# Patient Record
Sex: FEMALE | Race: WHITE | NOT HISPANIC OR LATINO | URBAN - METROPOLITAN AREA
[De-identification: names, ages, dates, MRNs, and addresses within clinical notes are randomized per-mention and may not be internally consistent; named-entity substitution may affect disease eponyms.]

---

## 2019-01-23 ENCOUNTER — EMERGENCY (EMERGENCY)
Facility: HOSPITAL | Age: 49
LOS: 1 days | End: 2019-01-23
Payer: COMMERCIAL

## 2019-01-23 PROCEDURE — 74177 CT ABD & PELVIS W/CONTRAST: CPT | Mod: 26

## 2019-01-23 PROCEDURE — 99284 EMERGENCY DEPT VISIT MOD MDM: CPT | Mod: 25

## 2019-08-19 ENCOUNTER — APPOINTMENT (OUTPATIENT)
Dept: OBGYN | Facility: CLINIC | Age: 49
End: 2019-08-19
Payer: COMMERCIAL

## 2019-08-19 VITALS — HEIGHT: 62 IN | WEIGHT: 128 LBS | BODY MASS INDEX: 23.55 KG/M2

## 2019-08-19 DIAGNOSIS — Z86.39 PERSONAL HISTORY OF OTHER ENDOCRINE, NUTRITIONAL AND METABOLIC DISEASE: ICD-10-CM

## 2019-08-19 DIAGNOSIS — Z78.9 OTHER SPECIFIED HEALTH STATUS: ICD-10-CM

## 2019-08-19 DIAGNOSIS — Z87.42 PERSONAL HISTORY OF OTHER DISEASES OF THE FEMALE GENITAL TRACT: ICD-10-CM

## 2019-08-19 DIAGNOSIS — Z80.3 FAMILY HISTORY OF MALIGNANT NEOPLASM OF BREAST: ICD-10-CM

## 2019-08-19 PROCEDURE — 99386 PREV VISIT NEW AGE 40-64: CPT

## 2019-08-19 NOTE — DISCUSSION/SUMMARY
[FreeTextEntry1] : a48 years old white female into the office for annual exam patient has been feeling well offers no complaints physical on pelvic exam within normal limits Pap smear done and the patient from a bent 25 minutes with the patient

## 2019-08-22 LAB — HPV HIGH+LOW RISK DNA PNL CVX: NOT DETECTED

## 2019-08-26 LAB — CYTOLOGY CVX/VAG DOC THIN PREP: NORMAL

## 2019-11-13 ENCOUNTER — APPOINTMENT (OUTPATIENT)
Dept: MAMMOGRAPHY | Facility: CLINIC | Age: 49
End: 2019-11-13

## 2021-08-17 ENCOUNTER — APPOINTMENT (OUTPATIENT)
Dept: OBGYN | Facility: CLINIC | Age: 51
End: 2021-08-17
Payer: COMMERCIAL

## 2021-08-17 ENCOUNTER — NON-APPOINTMENT (OUTPATIENT)
Age: 51
End: 2021-08-17

## 2021-08-17 VITALS
SYSTOLIC BLOOD PRESSURE: 110 MMHG | HEIGHT: 62 IN | WEIGHT: 130 LBS | BODY MASS INDEX: 23.92 KG/M2 | DIASTOLIC BLOOD PRESSURE: 68 MMHG

## 2021-08-17 DIAGNOSIS — Z00.00 ENCOUNTER FOR GENERAL ADULT MEDICAL EXAMINATION W/OUT ABNORMAL FINDINGS: ICD-10-CM

## 2021-08-17 PROCEDURE — 99396 PREV VISIT EST AGE 40-64: CPT

## 2021-08-17 NOTE — HISTORY OF PRESENT ILLNESS
[Patient reported mammogram was normal] : Patient reported mammogram was normal [Patient reported PAP Smear was normal] : Patient reported PAP Smear was normal [FreeTextEntry1] : 51yo  LMP Aug 2020 here for annual. Pt is not COVID vaccinated.  [Mammogramdate] : 12/2019  [PapSmeardate] : 2019  [BoneDensityDate] : N/A [ColonoscopyDate] : N/A

## 2021-08-17 NOTE — COUNSELING
[Nutrition/ Exercise/ Weight Management] : nutrition, exercise, weight management [Intimate Partner Violence] : intimate partner violence [STD (testing, results, tx)] : STD (testing, results, tx)

## 2021-08-17 NOTE — DISCUSSION/SUMMARY
[FreeTextEntry1] : 51yo  LMP Aug 2020 here for annual with blood blister on her inner vulva \par \par Blood blister: \par - RTO in 3 months to discuss stability\par \par Menopause: \par - Pt to continue to monitor periods. If she makes it to the end of Aug then she will be a year without menses \par - If pt has bleeding she is to start the clock over \par \par RHM: \par - DVS neg x 3\par - Dentist, PCP, Derm as discussed \par - Rx given for DEXA, mammo/sono\par - Colonoscopy as discussed \par - Offered STI screen today. Pt declined \par - Encouraged healthy diet, exercise, weight maint. \par \par Sheeba Aly MD \par Obstetrician/Gynecologist\par

## 2021-08-17 NOTE — PHYSICAL EXAM
[Appropriately responsive] : appropriately responsive [Alert] : alert [No Acute Distress] : no acute distress [No Lymphadenopathy] : no lymphadenopathy [Regular Rate Rhythm] : regular rate rhythm [No Murmurs] : no murmurs [Clear to Auscultation B/L] : clear to auscultation bilaterally [Soft] : soft [Non-tender] : non-tender [Non-distended] : non-distended [No HSM] : No HSM [No Lesions] : no lesions [No Mass] : no mass [Oriented x3] : oriented x3 [Examination Of The Breasts] : a normal appearance [No Masses] : no breast masses were palpable [Labia Majora] : normal [Labia Minora] : normal [Normal] : normal [Uterine Adnexae] : normal [Declined] : Patient declined rectal exam [FreeTextEntry1] : 3mm "blood blister" on internal labia minora just R of midline.

## 2021-08-20 LAB — HPV HIGH+LOW RISK DNA PNL CVX: NOT DETECTED

## 2021-08-23 LAB — CYTOLOGY CVX/VAG DOC THIN PREP: NORMAL

## 2021-09-23 ENCOUNTER — NON-APPOINTMENT (OUTPATIENT)
Age: 51
End: 2021-09-23

## 2021-09-30 ENCOUNTER — APPOINTMENT (OUTPATIENT)
Dept: OPHTHALMOLOGY | Facility: CLINIC | Age: 51
End: 2021-09-30

## 2021-10-20 ENCOUNTER — APPOINTMENT (OUTPATIENT)
Dept: OBGYN | Facility: CLINIC | Age: 51
End: 2021-10-20

## 2022-01-04 ENCOUNTER — APPOINTMENT (OUTPATIENT)
Dept: OBGYN | Facility: CLINIC | Age: 52
End: 2022-01-04

## 2022-01-18 ENCOUNTER — APPOINTMENT (OUTPATIENT)
Dept: OBGYN | Facility: CLINIC | Age: 52
End: 2022-01-18
Payer: COMMERCIAL

## 2022-01-18 VITALS
HEIGHT: 62 IN | SYSTOLIC BLOOD PRESSURE: 104 MMHG | WEIGHT: 133.9 LBS | BODY MASS INDEX: 24.64 KG/M2 | DIASTOLIC BLOOD PRESSURE: 70 MMHG

## 2022-01-18 PROCEDURE — 99212 OFFICE O/P EST SF 10 MIN: CPT

## 2022-01-18 NOTE — DISCUSSION/SUMMARY
[FreeTextEntry1] : 50yo  PMF with resolved vulvar lesion. \par \par - RTO in Aug for annual \par - Pt to RTO ASAP if she notices vulvar changes \par - All questions were solicited and answered \par \par Sheeba Aly MD \par Obstetrician/Gynecologist\par

## 2022-01-18 NOTE — HISTORY OF PRESENT ILLNESS
[FreeTextEntry1] : 52yo  PMF is here for follow up of vulvar lesion seen on annual exam on Aug. Pt has not noticed and changes or issues since her visit.

## 2022-01-18 NOTE — PHYSICAL EXAM
[Normal] : normal external genitalia [FreeTextEntry1] : 3mm R labial minora lesion completely resolved on extensive exam today. Likely a varicosity that resolved.

## 2022-05-30 NOTE — PHYSICAL EXAM
[Normal] : uterus [No Bleeding] : there was no active vaginal bleeding [Uterine Adnexae] : were not tender and not enlarged NEGATIVE

## 2022-11-01 ENCOUNTER — APPOINTMENT (OUTPATIENT)
Dept: OBGYN | Facility: CLINIC | Age: 52
End: 2022-11-01

## 2022-11-01 VITALS
WEIGHT: 130.31 LBS | BODY MASS INDEX: 23.98 KG/M2 | SYSTOLIC BLOOD PRESSURE: 112 MMHG | HEIGHT: 62 IN | DIASTOLIC BLOOD PRESSURE: 70 MMHG

## 2022-11-01 DIAGNOSIS — Z86.39 PERSONAL HISTORY OF OTHER ENDOCRINE, NUTRITIONAL AND METABOLIC DISEASE: ICD-10-CM

## 2022-11-01 PROCEDURE — 99396 PREV VISIT EST AGE 40-64: CPT

## 2022-11-01 PROCEDURE — 82274 ASSAY TEST FOR BLOOD FECAL: CPT | Mod: QW

## 2022-11-01 PROCEDURE — 99212 OFFICE O/P EST SF 10 MIN: CPT | Mod: 25

## 2022-11-01 RX ORDER — LEVOTHYROXINE SODIUM 0.07 MG/1
75 TABLET ORAL
Refills: 0 | Status: DISCONTINUED | COMMUNITY
End: 2022-11-01

## 2022-11-01 NOTE — HISTORY OF PRESENT ILLNESS
[Patient reported bone density results were abnormal] : Patient reported bone density results were abnormal [Patient reported mammogram was normal] : Patient reported mammogram was normal [Patient reported breast sonogram was normal] : Patient reported breast sonogram was normal [Patient reported PAP Smear was normal] : Patient reported PAP Smear was normal [Patient reported bone density results were normal] : Patient reported bone density results were normal [postmenopausal] : postmenopausal [Y] : Positive pregnancy history [TextBox_43] : never had one  [LMPDate] : 01/2019 [PGHxTotal] : 3 [[facility] [FreeTextEntry1] : 53yo  PMF here for annual.  Pt reports vaginal dryness and discomfort with intercourse. She is not using any OTC remedies  [Mammogramdate] : 9/2021 [BreastSonogramDate] : 9/2021  [TextBox_25] : b/l breast cysts  [PapSmeardate] : 2021 [BoneDensityDate] : 2021 [TextBox_37] : osteopenia

## 2022-11-01 NOTE — HISTORY OF PRESENT ILLNESS
[Patient reported bone density results were abnormal] : Patient reported bone density results were abnormal [Patient reported mammogram was normal] : Patient reported mammogram was normal [Patient reported breast sonogram was normal] : Patient reported breast sonogram was normal [Patient reported PAP Smear was normal] : Patient reported PAP Smear was normal [Patient reported bone density results were normal] : Patient reported bone density results were normal [postmenopausal] : postmenopausal [Y] : Positive pregnancy history [TextBox_43] : never had one  [LMPDate] : 01/2019 [PGHxTotal] : 3 [[facility] [FreeTextEntry1] : 53yo  PMF here for annual.  Pt reports vaginal dryness and discomfort with intercourse. She is not using any OTC remedies  [Mammogramdate] : 9/2021 [BreastSonogramDate] : 9/2021  [TextBox_25] : b/l breast cysts  [PapSmeardate] : 2021 [BoneDensityDate] : 2021 [TextBox_37] : osteopenia

## 2022-11-01 NOTE — DISCUSSION/SUMMARY
[FreeTextEntry1] : 51yo  PMF here for annual with vag dryness \par \par h/o breast cysts: \par - mammo/sono Rx given today \par \par Vag dryness: \par - Pt given info for Revaree today. Would like to dry OTC water based lube first \par \par RHM: \par - DVS neg x 3\par - Dentist, PCP, Derm as discussed \par - Rx given for, mammo/sono\par - Colonoscopy as discussed -- pt due ASAP . Understands importance of testing. Will make appt \par - Offered STI screen today. Pt pt declined \par - Encouraged healthy diet, exercise, weight maint. \par \par Sheeba Aly MD \par Obstetrician/Gynecologist\par \par \par Sheeba Aly MD \par Obstetrician/Gynecologist\par

## 2022-11-01 NOTE — PHYSICAL EXAM
[Appropriately responsive] : appropriately responsive [Alert] : alert [No Acute Distress] : no acute distress [No Lymphadenopathy] : no lymphadenopathy [Regular Rate Rhythm] : regular rate rhythm [No Murmurs] : no murmurs [Clear to Auscultation B/L] : clear to auscultation bilaterally [Soft] : soft [Non-tender] : non-tender [Non-distended] : non-distended [No HSM] : No HSM [No Lesions] : no lesions [No Mass] : no mass [Oriented x3] : oriented x3 [Examination Of The Breasts] : a normal appearance [No Masses] : no breast masses were palpable [Labia Majora] : normal [Labia Minora] : normal [Normal] : normal [Uterine Adnexae] : normal [FreeTextEntry1] : warty sub centimeter fleshy colored flat warty lesion on mons and R buttock.

## 2022-11-04 LAB
DATE COLLECTED: NORMAL
HEMOCCULT SP1 STL QL: NEGATIVE
HPV HIGH+LOW RISK DNA PNL CVX: NOT DETECTED
QUALITY CONTROL: YES

## 2022-11-08 LAB — CYTOLOGY CVX/VAG DOC THIN PREP: NORMAL

## 2022-11-15 ENCOUNTER — TRANSCRIPTION ENCOUNTER (OUTPATIENT)
Age: 52
End: 2022-11-15

## 2023-01-23 ENCOUNTER — APPOINTMENT (OUTPATIENT)
Dept: OBGYN | Facility: CLINIC | Age: 53
End: 2023-01-23
Payer: COMMERCIAL

## 2023-01-23 VITALS
DIASTOLIC BLOOD PRESSURE: 73 MMHG | BODY MASS INDEX: 25.21 KG/M2 | WEIGHT: 137 LBS | HEIGHT: 62 IN | SYSTOLIC BLOOD PRESSURE: 114 MMHG

## 2023-01-23 PROCEDURE — 99212 OFFICE O/P EST SF 10 MIN: CPT

## 2023-01-23 NOTE — PHYSICAL EXAM
[Normal] : uterus [No Bleeding] : there was no active vaginal bleeding [Tenderness] : tender [Uterine Adnexae] : were not tender and not enlarged [FreeTextEntry7] : minimal tenderness b/l.

## 2023-03-07 ENCOUNTER — APPOINTMENT (OUTPATIENT)
Dept: OBGYN | Facility: CLINIC | Age: 53
End: 2023-03-07
Payer: COMMERCIAL

## 2023-03-07 ENCOUNTER — APPOINTMENT (OUTPATIENT)
Dept: ANTEPARTUM | Facility: CLINIC | Age: 53
End: 2023-03-07
Payer: COMMERCIAL

## 2023-03-07 ENCOUNTER — ASOB RESULT (OUTPATIENT)
Age: 53
End: 2023-03-07

## 2023-03-07 VITALS
WEIGHT: 130 LBS | DIASTOLIC BLOOD PRESSURE: 69 MMHG | HEIGHT: 62 IN | BODY MASS INDEX: 23.92 KG/M2 | SYSTOLIC BLOOD PRESSURE: 104 MMHG

## 2023-03-07 PROCEDURE — 76856 US EXAM PELVIC COMPLETE: CPT | Mod: 59

## 2023-03-07 PROCEDURE — 76830 TRANSVAGINAL US NON-OB: CPT

## 2023-03-07 PROCEDURE — 99212 OFFICE O/P EST SF 10 MIN: CPT

## 2023-03-07 NOTE — HISTORY OF PRESENT ILLNESS
[FreeTextEntry1] : 51yo  PMF who is here to follow up R ov cyst. She had a sono today which demonstrated a 8mm EMS (previously 4mm), normal L ov and two simple R ov cysts (smaller than previously noted) -- 2cm & 1cm.  Pt doing well. No further pain and no VB.

## 2023-03-07 NOTE — DISCUSSION/SUMMARY
[FreeTextEntry1] : 53yo  PMF who is here to follow up R ov cysts which are decreasing in size and ? thickened EMS today (EMS noted to be difficult to visualize) but was previously 4mm about 6 wks ago. \par \par - Strict torsion and rupture precautions given \par - RTO in 2 month2 for repeat TVUS and MD visit. If EMS is thickened again on sono --> SIS and EMBx \par - All questions solicited and answered \par \par Sheeba Hannah MD \par Obstetrician/Gynecologist

## 2023-05-02 ENCOUNTER — RESULT REVIEW (OUTPATIENT)
Age: 53
End: 2023-05-02

## 2023-05-02 ENCOUNTER — ASOB RESULT (OUTPATIENT)
Age: 53
End: 2023-05-02

## 2023-05-02 ENCOUNTER — APPOINTMENT (OUTPATIENT)
Dept: OBGYN | Facility: CLINIC | Age: 53
End: 2023-05-02
Payer: COMMERCIAL

## 2023-05-02 ENCOUNTER — APPOINTMENT (OUTPATIENT)
Dept: ANTEPARTUM | Facility: CLINIC | Age: 53
End: 2023-05-02
Payer: COMMERCIAL

## 2023-05-02 VITALS
DIASTOLIC BLOOD PRESSURE: 74 MMHG | BODY MASS INDEX: 23.92 KG/M2 | HEIGHT: 62 IN | WEIGHT: 130 LBS | SYSTOLIC BLOOD PRESSURE: 114 MMHG

## 2023-05-02 PROCEDURE — 99213 OFFICE O/P EST LOW 20 MIN: CPT

## 2023-05-02 PROCEDURE — 76830 TRANSVAGINAL US NON-OB: CPT

## 2023-05-02 NOTE — PROCEDURE
[Endometrial Biopsy] : Endometrial biopsy [Time out performed] : Pre-procedure time out performed.  Patient's name, date of birth and procedure confirmed. [Consent Obtained] : Consent obtained [Thickened Endometrium] : thickened endometrium [Risks] : risks [Benefits] : benefits [Alternatives] : alternatives [Patient] : patient [Infection] : infection [Bleeding] : bleeding [Allergic Reaction] : allergic reaction [Uterine Perforation] : uterine perforation [Pain] : pain [No Premedication] : No premedication [None] : none [Tenaculum] : Tenaculum [Betadine] : Betadine [LMPDate] : PMF [de-identified] : unable to enter external os.

## 2023-05-02 NOTE — DISCUSSION/SUMMARY
[FreeTextEntry1] : Attempted EMBx today however os stenotic. Cytotec rx'd and pt to RTO tomorrow AM for repeat attempt. \par \par Ov cyst: \par - EYAL and OVA 1 sent today \par - MRI A &P w & w.o contrast \par - Pt to see Dr TUTTLE for discussion of BSO +/- hyst \par \par Thickened EMS: \par - Endo Bx attempted today however os stenotic. \par - RTO tomorrow for repeat attempt with cytotec \par - Aware she cannot have anything in vagina for 2 wks after procedure \par \par All questions solicited and answered \par \par Sheeba Hannah MD \par Obstetrician/Gynecologist\par

## 2023-05-02 NOTE — HISTORY OF PRESENT ILLNESS
[FreeTextEntry1] : 51yo  PMF who is here to follow up R ov cyst. Her sono today demonstrates a complex R ov cyst which is larger than 2 months prior and a new L ov cyst. Her EMS remains >4 but she denies bleeding but has discomfort in pelvis with R >>L. .

## 2023-05-03 ENCOUNTER — APPOINTMENT (OUTPATIENT)
Dept: OBGYN | Facility: CLINIC | Age: 53
End: 2023-05-03
Payer: COMMERCIAL

## 2023-05-03 VITALS
HEIGHT: 62 IN | WEIGHT: 130 LBS | DIASTOLIC BLOOD PRESSURE: 67 MMHG | BODY MASS INDEX: 23.92 KG/M2 | SYSTOLIC BLOOD PRESSURE: 106 MMHG

## 2023-05-03 PROCEDURE — 99212 OFFICE O/P EST SF 10 MIN: CPT | Mod: 25

## 2023-05-03 PROCEDURE — 58100 BIOPSY OF UTERUS LINING: CPT

## 2023-05-08 ENCOUNTER — APPOINTMENT (OUTPATIENT)
Dept: GYNECOLOGIC ONCOLOGY | Facility: CLINIC | Age: 53
End: 2023-05-08
Payer: COMMERCIAL

## 2023-05-08 DIAGNOSIS — R93.89 ABNORMAL FINDINGS ON DIAGNOSTIC IMAGING OF OTHER SPECIFIED BODY STRUCTURES: ICD-10-CM

## 2023-05-08 PROCEDURE — 99204 OFFICE O/P NEW MOD 45 MIN: CPT

## 2023-05-08 RX ORDER — LEVOTHYROXINE SODIUM 0.07 MG/1
75 TABLET ORAL
Refills: 0 | Status: ACTIVE | COMMUNITY

## 2023-05-08 NOTE — DISCUSSION/SUMMARY
[Reviewed Clinical Lab Test(s)] : Results of clinical tests were reviewed. [Reviewed Radiology Report(s)] : Radiology reports were reviewed. [Pre Op] : The differential diagnosis was discussed in detail. The indications, risks, benefits and alternatives were discussed. [unfilled] expressed an understanding of the treatment rationale and her questions were answered to her apparent satisfaction.

## 2023-05-09 ENCOUNTER — APPOINTMENT (OUTPATIENT)
Dept: MRI IMAGING | Facility: CLINIC | Age: 53
End: 2023-05-09
Payer: COMMERCIAL

## 2023-05-09 PROCEDURE — A9585: CPT

## 2023-05-09 PROCEDURE — 72197 MRI PELVIS W/O & W/DYE: CPT

## 2023-05-09 PROCEDURE — 74183 MRI ABD W/O CNTR FLWD CNTR: CPT

## 2023-05-10 NOTE — PROCEDURE
[Endometrial Biopsy] : Endometrial biopsy [Time out performed] : Pre-procedure time out performed.  Patient's name, date of birth and procedure confirmed. [Consent Obtained] : Consent obtained [Pre-op Evaluation] : Pre-op evaluation [Thickened Endometrium] : thickened endometrium [Risks] : risks [Benefits] : benefits [Alternatives] : alternatives [Patient] : patient [Infection] : infection [Bleeding] : bleeding [Allergic Reaction] : allergic reaction [Uterine Perforation] : uterine perforation [Pain] : pain [Betadine] : Betadine [None] : none [Tenaculum] : Tenaculum [Easy Passage] : Easy passage [Scant] : scant [Specimen Collected] : collected [Sent to Pathology] : placed in buffered formalin and sent for pathology [Tolerated Well] : Patient tolerated the procedure well [No Complications] : No complications [LMPDate] : PMF

## 2023-05-10 NOTE — PLAN
[FreeTextEntry1] : - Strict precautions and aftercare instructions reviewed \par - Aware she cannot have anything in vagina or be submerged in water until seen by me \par - RTO in 2 wks for follow up and results \par \par Sheeba Hannah MD \par Obstetrician/Gynecologist\par \par

## 2023-05-11 ENCOUNTER — NON-APPOINTMENT (OUTPATIENT)
Age: 53
End: 2023-05-11

## 2023-05-11 NOTE — CHIEF COMPLAINT
[FreeTextEntry1] : [facility]\par \par [facility] [phone] at [address] [place] [id number]\par

## 2023-05-11 NOTE — ASSESSMENT
[FreeTextEntry1] : 53yo postmenopausal female with bilateral complex ovarian cysts with possible papillae/nodules, mildly thickened endometrium. Normal EYAL. \par \par Discussed with patient treatment options of hysterectomy, BSO as well as option of leaving one ovary intact. Discussed the risks of all cause mortality and the benefits of ovarian preservation until the age of 65. However, in this case, I feel the benefits of removing both ovaries are  higher than the risks. Explained the lack of adequate early detection of ovarian cancer and how I cannot be 100% sure that her other ovary is not affected and the possibility of requiring additional surgery if a malignancy is found. She may benefit from HRT if her pathology is benign and she is symptomatic. Pt and  are eager to have everything removed. Due to her history of umbilcal hernia repair with unknown mesh placement, I would favor a robotic approach.  \par \par I discussed at length with the patient the nature, purpose, risks, benefits, and alternatives of Robot assisted total laparoscopic hysterectomy with bilateral salpingo-oophorectomy, possible retroperitoneal lymphadenectomy and possible staging (including possible omentectomy).  The patient understands the risks to include,but not be limited to, bowel injury, bleeding (with the possible need for transfusion), bladder or ureteral injury, infections, deep venous thrombosis, and ely-operative death.  The patient also understands that her surgery may not be able to be performed robotically and that she may need a laparotomy.  She also understands the limitations of robotic surgery and the possibility of missing a surgical complication with need for subsequent re-exploration.  She agrees to proceed.  She asked numerous questions which were answered to her satisfaction.  She understands the need for a pre-operative bowel preparation and agrees to comply with our instructions.  She also understands the rationale for a cystoscopy at the completion of the procedure and the potential risks of cystoscopy.  The patient also understands the possible limitations of robotic staging compared to a laparotomy approach.\par

## 2023-05-11 NOTE — END OF VISIT
[FreeTextEntry3] : Written by Jennifer COHEN, acting as a scribe for Dr. Lennie Mccollum.\par This note accurately reflects the work and decisions made by me.\par

## 2023-05-11 NOTE — PHYSICAL EXAM
[Chaperone Present] : A chaperone was present in the examining room during all aspects of the physical examination [Normal] : Cervix: Normal, no lesions [FreeTextEntry1] : Patient was interviewed and examined with chaperone present. Name of chaperone: Jennfier Mobley

## 2023-05-11 NOTE — HISTORY OF PRESENT ILLNESS
[FreeTextEntry1] : 51yo ,  x1, c/s x 2, LMP  referred by Dr. Sheeba Hannah for evaluation of bilateral complex cysts with possible papillae/nodules. Pt had presented to [facility] ER in 2023 with RLQ and was found to have a 3.5cm R ovarian cyst with septation vs small adjacent cyst, 4mm stripe and small amount of FF. She followed up with Dr. Hannah and a repeat pelvic US on 3/7/23 revealed RV uterus at 5.3 x 4.8 x 3.7cm, 8.0mm lining, 2 simple right ovarian cysts with largest at 1.9cm, left ovary poorly seen, no FF. Follow up pelvic US on 23 revealed 6.0mm lining and bilateral complex ovarian cysts with possible papillae/nodules. Pelvic MRI advised and she is scheduled tomorrow. EMB was attempted on 5/3/23 but unsuccessful due to cervical stenosis. Biopsy was attempted again after misoprostol on -results pending. Pt continues to have RLQ discomfort described as a dull ache and not as severe as the pain back in January. Denies VB. denies bowel or bladder issues. History of endometriosis. Pt is eager to have a hysterectomy. \par \par 23-OVA 1-3.8, EYAL -wnl \par \par Family history of breast cancer in her mat GM and aunt.\par \par Health maintenance:\par \par Pap smear-2022-neg, HPV neg\par Mammo- 2022-BR-1\par Colonoscopy- -reports wnl \par DEXA-2021-osteopenia\par

## 2023-05-12 ENCOUNTER — NON-APPOINTMENT (OUTPATIENT)
Age: 53
End: 2023-05-12

## 2023-05-12 LAB
CA 125 (LABCORP): 8.3 U/ML
CORE LAB BIOPSY: NORMAL
DISCLAIMER (BIOREF): NORMAL
HE4X: 49 PMOL/L
METHODSANDLIMITATIONS: NORMAL
OVA1: 3.8
OVA1PLUS: NORMAL
POSTMENOPAUSAL ROMA: 0.76
PREMENOPAUSAL ROMA: 0.69
REFERENCES: NORMAL
RISK OF MALIGNANCY POSTMENOPAUSAL/HIGH RISK ULTRASOUND: NORMAL
RISK OF MALIGNANCY POSTMENOPAUSAL/LOW RISK ULTRASOUND: NORMAL
RISK OF MALIGNANCY PREMENOPAUSAL/HIGH RISK ULTRASOUND: NORMAL
RISK OF MALIGNANCY PREMENOPAUSAL/LOW RISK ULTRASOUND: NORMAL
ROMA COMMENT: NORMAL

## 2023-05-16 ENCOUNTER — NON-APPOINTMENT (OUTPATIENT)
Age: 53
End: 2023-05-16

## 2023-05-17 ENCOUNTER — NON-APPOINTMENT (OUTPATIENT)
Age: 53
End: 2023-05-17

## 2023-05-19 ENCOUNTER — APPOINTMENT (OUTPATIENT)
Dept: OBGYN | Facility: CLINIC | Age: 53
End: 2023-05-19

## 2023-05-22 ENCOUNTER — OUTPATIENT (OUTPATIENT)
Dept: OUTPATIENT SERVICES | Facility: HOSPITAL | Age: 53
LOS: 1 days | End: 2023-05-22
Payer: COMMERCIAL

## 2023-05-22 VITALS
HEART RATE: 68 BPM | HEIGHT: 62 IN | WEIGHT: 135.36 LBS | OXYGEN SATURATION: 98 % | TEMPERATURE: 98 F | DIASTOLIC BLOOD PRESSURE: 70 MMHG | SYSTOLIC BLOOD PRESSURE: 110 MMHG | RESPIRATION RATE: 16 BRPM

## 2023-05-22 DIAGNOSIS — N83.201 UNSPECIFIED OVARIAN CYST, RIGHT SIDE: ICD-10-CM

## 2023-05-22 DIAGNOSIS — Z98.891 HISTORY OF UTERINE SCAR FROM PREVIOUS SURGERY: Chronic | ICD-10-CM

## 2023-05-22 DIAGNOSIS — Z98.890 OTHER SPECIFIED POSTPROCEDURAL STATES: Chronic | ICD-10-CM

## 2023-05-22 DIAGNOSIS — N83.202 UNSPECIFIED OVARIAN CYST, LEFT SIDE: ICD-10-CM

## 2023-05-22 DIAGNOSIS — Z98.51 TUBAL LIGATION STATUS: Chronic | ICD-10-CM

## 2023-05-22 DIAGNOSIS — Z92.89 PERSONAL HISTORY OF OTHER MEDICAL TREATMENT: Chronic | ICD-10-CM

## 2023-05-22 DIAGNOSIS — Z01.818 ENCOUNTER FOR OTHER PREPROCEDURAL EXAMINATION: ICD-10-CM

## 2023-05-22 DIAGNOSIS — Z29.9 ENCOUNTER FOR PROPHYLACTIC MEASURES, UNSPECIFIED: ICD-10-CM

## 2023-05-22 LAB
A1C WITH ESTIMATED AVERAGE GLUCOSE RESULT: 5.4 % — SIGNIFICANT CHANGE UP (ref 4–5.6)
ALBUMIN SERPL ELPH-MCNC: 4.3 G/DL — SIGNIFICANT CHANGE UP (ref 3.3–5.2)
ALP SERPL-CCNC: 125 U/L — HIGH (ref 40–120)
ALT FLD-CCNC: 12 U/L — SIGNIFICANT CHANGE UP
ANION GAP SERPL CALC-SCNC: 10 MMOL/L — SIGNIFICANT CHANGE UP (ref 5–17)
APTT BLD: 27.8 SEC — SIGNIFICANT CHANGE UP (ref 27.5–35.5)
AST SERPL-CCNC: 21 U/L — SIGNIFICANT CHANGE UP
BASOPHILS # BLD AUTO: 0.01 K/UL — SIGNIFICANT CHANGE UP (ref 0–0.2)
BASOPHILS NFR BLD AUTO: 0.2 % — SIGNIFICANT CHANGE UP (ref 0–2)
BILIRUB SERPL-MCNC: 1.3 MG/DL — SIGNIFICANT CHANGE UP (ref 0.4–2)
BLD GP AB SCN SERPL QL: SIGNIFICANT CHANGE UP
BUN SERPL-MCNC: 15.9 MG/DL — SIGNIFICANT CHANGE UP (ref 8–20)
CALCIUM SERPL-MCNC: 9.1 MG/DL — SIGNIFICANT CHANGE UP (ref 8.4–10.5)
CHLORIDE SERPL-SCNC: 105 MMOL/L — SIGNIFICANT CHANGE UP (ref 96–108)
CO2 SERPL-SCNC: 25 MMOL/L — SIGNIFICANT CHANGE UP (ref 22–29)
CREAT SERPL-MCNC: 0.75 MG/DL — SIGNIFICANT CHANGE UP (ref 0.5–1.3)
EGFR: 96 ML/MIN/1.73M2 — SIGNIFICANT CHANGE UP
EOSINOPHIL # BLD AUTO: 0.07 K/UL — SIGNIFICANT CHANGE UP (ref 0–0.5)
EOSINOPHIL NFR BLD AUTO: 1.5 % — SIGNIFICANT CHANGE UP (ref 0–6)
ESTIMATED AVERAGE GLUCOSE: 108 MG/DL — SIGNIFICANT CHANGE UP (ref 68–114)
GLUCOSE SERPL-MCNC: 92 MG/DL — SIGNIFICANT CHANGE UP (ref 70–99)
HCT VFR BLD CALC: 42.4 % — SIGNIFICANT CHANGE UP (ref 34.5–45)
HGB BLD-MCNC: 13.7 G/DL — SIGNIFICANT CHANGE UP (ref 11.5–15.5)
IMM GRANULOCYTES NFR BLD AUTO: 0.2 % — SIGNIFICANT CHANGE UP (ref 0–0.9)
INR BLD: 0.97 RATIO — SIGNIFICANT CHANGE UP (ref 0.88–1.16)
LYMPHOCYTES # BLD AUTO: 1.44 K/UL — SIGNIFICANT CHANGE UP (ref 1–3.3)
LYMPHOCYTES # BLD AUTO: 31.6 % — SIGNIFICANT CHANGE UP (ref 13–44)
MCHC RBC-ENTMCNC: 30.2 PG — SIGNIFICANT CHANGE UP (ref 27–34)
MCHC RBC-ENTMCNC: 32.3 GM/DL — SIGNIFICANT CHANGE UP (ref 32–36)
MCV RBC AUTO: 93.4 FL — SIGNIFICANT CHANGE UP (ref 80–100)
MONOCYTES # BLD AUTO: 0.4 K/UL — SIGNIFICANT CHANGE UP (ref 0–0.9)
MONOCYTES NFR BLD AUTO: 8.8 % — SIGNIFICANT CHANGE UP (ref 2–14)
NEUTROPHILS # BLD AUTO: 2.62 K/UL — SIGNIFICANT CHANGE UP (ref 1.8–7.4)
NEUTROPHILS NFR BLD AUTO: 57.7 % — SIGNIFICANT CHANGE UP (ref 43–77)
PLATELET # BLD AUTO: 185 K/UL — SIGNIFICANT CHANGE UP (ref 150–400)
POTASSIUM SERPL-MCNC: 5.3 MMOL/L — SIGNIFICANT CHANGE UP (ref 3.5–5.3)
POTASSIUM SERPL-SCNC: 5.3 MMOL/L — SIGNIFICANT CHANGE UP (ref 3.5–5.3)
PROT SERPL-MCNC: 6.8 G/DL — SIGNIFICANT CHANGE UP (ref 6.6–8.7)
PROTHROM AB SERPL-ACNC: 11.3 SEC — SIGNIFICANT CHANGE UP (ref 10.5–13.4)
RBC # BLD: 4.54 M/UL — SIGNIFICANT CHANGE UP (ref 3.8–5.2)
RBC # FLD: 13.1 % — SIGNIFICANT CHANGE UP (ref 10.3–14.5)
SODIUM SERPL-SCNC: 140 MMOL/L — SIGNIFICANT CHANGE UP (ref 135–145)
WBC # BLD: 4.55 K/UL — SIGNIFICANT CHANGE UP (ref 3.8–10.5)
WBC # FLD AUTO: 4.55 K/UL — SIGNIFICANT CHANGE UP (ref 3.8–10.5)

## 2023-05-22 PROCEDURE — G0463: CPT

## 2023-05-22 PROCEDURE — 93005 ELECTROCARDIOGRAM TRACING: CPT

## 2023-05-22 PROCEDURE — 93010 ELECTROCARDIOGRAM REPORT: CPT

## 2023-05-22 PROCEDURE — 86304 IMMUNOASSAY TUMOR CA 125: CPT

## 2023-05-22 NOTE — H&P PST ADULT - NSICDXPASTMEDICALHX_GEN_ALL_CORE_FT
PAST MEDICAL HISTORY:  Hypothyroid     Unspecified ovarian cyst, left side     Unspecified ovarian cyst, right side

## 2023-05-22 NOTE — H&P PST ADULT - PROBLEM SELECTOR PLAN 3
caprini score risk for dvt, SCD ordered, surgical team to assess for dvt prophylaxis caprini score 3, moderate risk for dvt, SCD ordered, surgical team to assess for dvt prophylaxis

## 2023-05-22 NOTE — H&P PST ADULT - PROBLEM SELECTOR PLAN 1
preop assessment, medical clearance pending, robotic assisted total laparoscopic hysterectomy, bilateral salpingo oophorectomy with Dr Mccollum scheduled for 6/5/2023

## 2023-05-22 NOTE — H&P PST ADULT - NSICDXPASTSURGICALHX_GEN_ALL_CORE_FT
PAST SURGICAL HISTORY:  H/O hernia repair     H/O tubal ligation     History of 2  sections     History of endometrial biopsy

## 2023-05-22 NOTE — H&P PST ADULT - ASSESSMENT
51 yo F ,  x1, c/s x 2, LMP  PMH of endometriosis, presents with c/o bilateral complex ovarian cysts. She had presented to [facility] ER in 2023 with RLQ and was found to have a 3.5 cm R ovarian cyst with septation vs small adjacent cyst, 4 mm stripe and small amount of free fluid. Follow up pelvic US on 3/7/2023 revealed RV uterus at 5.3 x 4.8 x 3.7 cm, 8.0 mm lining, 2 simple right ovarian cysts with largest at 1.9 cm, left ovary poorly seen, no FF. Follow up pelvic US on 2023 revealed 6.0 mm lining and bilateral complex ovarian cysts with possible papillae/nodules. MRI results: right ovarian cyst c/w previous imaging, no lymphadenopathy; liver cyst noted as well as dilated hepatic duct. Endometrial biopsy was benign (superficial strips of inactive endometrium with focal stromal breakdown) as was her EYAL and OVA 1 testing. She continues to have RLQ discomfort described as a dull ache and not as severe as the pain back in January. Denies vaginal bleeding, or any bowel or bladder issues. FHx of breast cancer in her mat GM and aunt. Preop assessment prior to robotic assisted total laparoscopic hysterectomy, bilateral salpingo oophorectomy with Dr Mccollum scheduled for 2023    Written and oral ERP instructions given, pt verbalized understanding.     Patient was educated on preop preparation with written and verbal instructions. Pt was informed to obtain clearance >3 days before surgery. Pt was educated on NSAIDs, multivitamins and herbals that increase the risk of bleeding and need to be stopped 7 days before procedure. Pt verbalized understanding of the above.     OPIOID RISK TOOL    ANN EACH BOX THAT APPLIES AND ADD TOTALS AT THE END    FAMILY HISTORY OF SUBSTANCE ABUSE                 FEMALE         MALE                                                Alcohol                             [  ]1 pt          [  ]3pts                                               Illegal Durgs                     [  ]2 pts        [  ]3pts                                               Rx Drugs                           [  ]4 pts        [  ]4 pts    PERSONAL HISTORY OF SUBSTANCE ABUSE                                                                                          Alcohol                             [  ]3 pts       [  ]3 pts                                               Illegal Drugs                     [  ]4 pts        [  ]4 pts                                               Rx Drugs                           [  ]5 pts        [  ]5 pts    AGE BETWEEN 16-45 YEARS                                      [  ]1 pt         [  ]1 pt    HISTORY OF PREADOLESCENT   SEXUAL ABUSE                                                             [  ]3 pts        [  ]0pts    PSYCHOLOGICAL DISEASE                     ADD, OCD, Bipolar, Schizophrenia        [  ]2 pts         [  ]2 pts                      Depression                                               [  ]1 pt           [  ]1 pt           SCORING TOTAL   (add numbers and type here)              ( 0 )                                     A score of 3 or lower indicated LOW risk for future opioid abuse  A score of 4 to 7 indicated moderate risk for future opioid abuse  A score of 8 or higher indicates a high risk for opioid abuse    CAPRINI VTE 2.0 SCORE [CLOT updated 2019]    AGE RELATED RISK FACTORS                                                       MOBILITY RELATED FACTORS  [x ] Age 41-60 years                                            (1 Point)                    [ ] Bed rest                                                        (1 Point)  [ ] Age: 61-74 years                                           (2 Points)                  [ ] Plaster cast                                                   (2 Points)  [ ] Age= 75 years                                              (3 Points)                    [ ] Bed bound for more than 72 hours                 (2 Points)    DISEASE RELATED RISK FACTORS                                               GENDER SPECIFIC FACTORS  [ ] Edema in the lower extremities                       (1 Point)              [ ] Pregnancy                                                     (1 Point)  [ ] Varicose veins                                               (1 Point)                     [ ] Post-partum < 6 weeks                                   (1 Point)             [x ] BMI > 25 Kg/m2                                            (1 Point)                     [ ] Hormonal therapy  or oral contraception          (1 Point)                 [ ] Sepsis (in the previous month)                        (1 Point)               [ ] History of pregnancy complications                 (1 point)  [ ] Pneumonia or serious lung disease                                               [ ] Unexplained or recurrent                     (1 Point)           (in the previous month)                               (1 Point)  [ ] Abnormal pulmonary function test                     (1 Point)                 SURGERY RELATED RISK FACTORS  [ ] Acute myocardial infarction                              (1 Point)               [ ]  Section                                             (1 Point)  [ ] Congestive heart failure (in the previous month)  (1 Point)      [ ] Minor surgery                                                  (1 Point)   [ ] Inflammatory bowel disease                             (1 Point)               [ ] Arthroscopic surgery                                        (2 Points)  [ ] Central venous access                                      (2 Points)                [x ] General surgery lasting more than 45 minutes (2 points)  [ ] Malignancy- Present or previous                   (2 Points)                [ ] Elective arthroplasty                                         (5 points)    [ ] Stroke (in the previous month)                          (5 Points)                                                                                                                                                           HEMATOLOGY RELATED FACTORS                                                 TRAUMA RELATED RISK FACTORS  [ ] Prior episodes of VTE                                     (3 Points)                [ ] Fracture of the hip, pelvis, or leg                       (5 Points)  [ ] Positive family history for VTE                         (3 Points)             [ ] Acute spinal cord injury (in the previous month)  (5 Points)  [ ] Prothrombin 40522 A                                     (3 Points)               [ ] Paralysis  (less than 1 month)                             (5 Points)  [ ] Factor V Leiden                                             (3 Points)                  [ ] Multiple Trauma within 1 month                        (5 Points)  [ ] Lupus anticoagulants                                     (3 Points)                                                           [ ] Anticardiolipin antibodies                               (3 Points)                                                       [ ] High homocysteine in the blood                      (3 Points)                                             [ ] Other congenital or acquired thrombophilia      (3 Points)                                                [ ] Heparin induced thrombocytopenia                  (3 Points)                                     Total Score [      4    ] 51 yo F ,  x1, c/s x 2, LMP  PMH of endometriosis, presents with c/o bilateral ovarian cysts. She presented to [facility] ER in 2023 with RLQ pain and was found to have a 3.5 cm R ovarian cyst with septation vs small adjacent cyst, 4 mm stripe and small amount of free fluid. Follow up pelvic US on 3/7/2023 revealed RV uterus at 5.3 x 4.8 x 3.7 cm, 8.0 mm lining, 2 simple right ovarian cysts with largest at 1.9 cm, left ovary poorly seen, no FF. Follow up pelvic US on 2023 revealed 6.0 mm lining and bilateral complex ovarian cysts with possible papillae/nodules. MRI results: right ovarian cyst c/w previous imaging, no lymphadenopathy; liver cyst noted as well as dilated hepatic duct. Endometrial biopsy was benign (superficial strips of inactive endometrium with focal stromal breakdown) as was her EYAL and OVA 1 testing. She continues to have RLQ discomfort described as a dull ache and not as severe as the pain back in January. Denies vaginal bleeding, or any bowel or bladder issues. FHx of breast cancer in her maternal grandmother and aunt. Preop assessment prior to robotic assisted total laparoscopic hysterectomy, bilateral salpingo oophorectomy, possible staging, cystoscopy with Dr Mccollum scheduled for 2023    Written and oral ERP instructions given, pt verbalized understanding.     Patient was educated on preop preparation with written and verbal instructions. Pt was informed to obtain clearance >3 days before surgery. Pt was educated on NSAIDs, multivitamins and herbals that increase the risk of bleeding and need to be stopped 7 days before procedure. Pt verbalized understanding of the above.     OPIOID RISK TOOL    ANN EACH BOX THAT APPLIES AND ADD TOTALS AT THE END    FAMILY HISTORY OF SUBSTANCE ABUSE                 FEMALE         MALE                                                Alcohol                             [  ]1 pt          [  ]3pts                                               Illegal Drugs                     [  ]2 pts        [  ]3pts                                               Rx Drugs                           [  ]4 pts        [  ]4 pts    PERSONAL HISTORY OF SUBSTANCE ABUSE                                                                                          Alcohol                             [  ]3 pts       [  ]3 pts                                               Illegal Drugs                     [  ]4 pts        [  ]4 pts                                               Rx Drugs                           [  ]5 pts        [  ]5 pts    AGE BETWEEN 16-45 YEARS                                      [  ]1 pt         [  ]1 pt    HISTORY OF PREADOLESCENT   SEXUAL ABUSE                                                             [  ]3 pts        [  ]0pts    PSYCHOLOGICAL DISEASE                     ADD, OCD, Bipolar, Schizophrenia        [  ]2 pts         [  ]2 pts                      Depression                                               [  ]1 pt           [  ]1 pt           SCORING TOTAL   (add numbers and type here)              ( 0 )                                     A score of 3 or lower indicated LOW risk for future opioid abuse  A score of 4 to 7 indicated moderate risk for future opioid abuse  A score of 8 or higher indicates a high risk for opioid abuse    CAPRINI VTE 2.0 SCORE [CLOT updated 2019]    AGE RELATED RISK FACTORS                                                       MOBILITY RELATED FACTORS  [x ] Age 41-60 years                                            (1 Point)                    [ ] Bed rest                                                        (1 Point)  [ ] Age: 61-74 years                                           (2 Points)                  [ ] Plaster cast                                                   (2 Points)  [ ] Age= 75 years                                              (3 Points)                    [ ] Bed bound for more than 72 hours                 (2 Points)    DISEASE RELATED RISK FACTORS                                               GENDER SPECIFIC FACTORS  [ ] Edema in the lower extremities                       (1 Point)              [ ] Pregnancy                                                     (1 Point)  [ ] Varicose veins                                               (1 Point)                     [ ] Post-partum < 6 weeks                                   (1 Point)             [ ] BMI > 25 Kg/m2                                            (1 Point)                     [ ] Hormonal therapy  or oral contraception          (1 Point)                 [ ] Sepsis (in the previous month)                        (1 Point)               [ ] History of pregnancy complications                 (1 point)  [ ] Pneumonia or serious lung disease                                               [ ] Unexplained or recurrent                     (1 Point)           (in the previous month)                               (1 Point)  [ ] Abnormal pulmonary function test                     (1 Point)                 SURGERY RELATED RISK FACTORS  [ ] Acute myocardial infarction                              (1 Point)               [ ]  Section                                             (1 Point)  [ ] Congestive heart failure (in the previous month)  (1 Point)      [ ] Minor surgery                                                  (1 Point)   [ ] Inflammatory bowel disease                             (1 Point)               [ ] Arthroscopic surgery                                        (2 Points)  [ ] Central venous access                                      (2 Points)                [x ] General surgery lasting more than 45 minutes (2 points)  [ ] Malignancy- Present or previous                   (2 Points)                [ ] Elective arthroplasty                                         (5 points)    [ ] Stroke (in the previous month)                          (5 Points)                                                                                                                                                           HEMATOLOGY RELATED FACTORS                                                 TRAUMA RELATED RISK FACTORS  [ ] Prior episodes of VTE                                     (3 Points)                [ ] Fracture of the hip, pelvis, or leg                       (5 Points)  [ ] Positive family history for VTE                         (3 Points)             [ ] Acute spinal cord injury (in the previous month)  (5 Points)  [ ] Prothrombin 56137 A                                     (3 Points)               [ ] Paralysis  (less than 1 month)                             (5 Points)  [ ] Factor V Leiden                                             (3 Points)                  [ ] Multiple Trauma within 1 month                        (5 Points)  [ ] Lupus anticoagulants                                     (3 Points)                                                           [ ] Anticardiolipin antibodies                               (3 Points)                                                       [ ] High homocysteine in the blood                      (3 Points)                                             [ ] Other congenital or acquired thrombophilia      (3 Points)                                                [ ] Heparin induced thrombocytopenia                  (3 Points)                                     Total Score [      3    ]

## 2023-05-22 NOTE — H&P PST ADULT - HISTORY OF PRESENT ILLNESS
53 yo F ,  x1, c/s x 2, LMP  PMH of endometriosis, presents with c/o bilateral complex ovarian cysts. She had presented to [facility] ER in 2023 with RLQ and was found to have a 3.5 cm R ovarian cyst with septation vs small adjacent cyst, 4 mm stripe and small amount of free fluid. Follow up pelvic US on 3/7/2023 revealed RV uterus at 5.3 x 4.8 x 3.7 cm, 8.0 mm lining, 2 simple right ovarian cysts with largest at 1.9 cm, left ovary poorly seen, no FF. Follow up pelvic US on 2023 revealed 6.0 mm lining and bilateral complex ovarian cysts with possible papillae/nodules. MRI results: right ovarian cyst c/w previous imaging, no lymphadenopathy; liver cyst noted as well as dilated hepatic duct. Endometrial biopsy was benign (superficial strips of inactive endometrium with focal stromal breakdown) as was her EYAL and OVA 1 testing. She continues to have RLQ discomfort described as a dull ache and not as severe as the pain back in January. Denies vaginal bleeding, or any bowel or bladder issues. FHx of breast cancer in her mat GM and aunt. Preop assessment prior to robotic assisted total laparoscopic hysterectomy, bilateral salpingo oophorectomy with Dr Mccollum scheduled for 2023    Health maintenance:  Pap smear: 2022 neg, HPV neg  Mammo: 2022, BR-1  Colonoscopy: , reports wnl  DEXA 2021: osteopenia 51 yo F ,  x1, c/s x 2, LMP  PMH of endometriosis, presents with c/o bilateral ovarian cysts. She presented to [facility] ER in 2023 with RLQ pain and was found to have a 3.5 cm R ovarian cyst with septation vs small adjacent cyst, 4 mm stripe and small amount of free fluid. Follow up pelvic US on 3/7/2023 revealed RV uterus at 5.3 x 4.8 x 3.7 cm, 8.0 mm lining, 2 simple right ovarian cysts with largest at 1.9 cm, left ovary poorly seen, no FF. Follow up pelvic US on 2023 revealed 6.0 mm lining and bilateral complex ovarian cysts with possible papillae/nodules. MRI results: right ovarian cyst c/w previous imaging, no lymphadenopathy; liver cyst noted as well as dilated hepatic duct. Endometrial biopsy was benign (superficial strips of inactive endometrium with focal stromal breakdown) as was her EYAL and OVA 1 testing. She continues to have RLQ discomfort described as a dull ache and not as severe as the pain back in January. Denies vaginal bleeding, or any bowel or bladder issues. FHx of breast cancer in her maternal grandmother and aunt. Preop assessment prior to robotic assisted total laparoscopic hysterectomy, bilateral salpingo oophorectomy, possible staging, cystoscopy with Dr Mccollum scheduled for 2023    Health maintenance:  Pap smear: 2022 neg, HPV neg  Mammo: 2022, BR-1  Colonoscopy: , reports wnl  DEXA 2021: osteopenia

## 2023-05-22 NOTE — H&P PST ADULT - NSICDXFAMILYHX_GEN_ALL_CORE_FT
FAMILY HISTORY:  Grandparent  Still living? Unknown  FH: breast cancer, Age at diagnosis: Age Unknown    Aunt  Still living? Unknown  FH: breast cancer, Age at diagnosis: Age Unknown

## 2023-05-22 NOTE — H&P PST ADULT - GASTROINTESTINAL
soft/nontender/nondistended/normal active bowel sounds/no guarding/no masses palpable soft/nondistended/normal active bowel sounds/no guarding/tender details…

## 2023-05-23 LAB — CANCER AG125 SERPL-ACNC: 8 U/ML — SIGNIFICANT CHANGE UP

## 2023-06-05 ENCOUNTER — RESULT REVIEW (OUTPATIENT)
Age: 53
End: 2023-06-05

## 2023-06-05 ENCOUNTER — OUTPATIENT (OUTPATIENT)
Dept: OUTPATIENT SERVICES | Facility: HOSPITAL | Age: 53
LOS: 1 days | End: 2023-06-05
Payer: COMMERCIAL

## 2023-06-05 ENCOUNTER — TRANSCRIPTION ENCOUNTER (OUTPATIENT)
Age: 53
End: 2023-06-05

## 2023-06-05 VITALS
HEART RATE: 67 BPM | SYSTOLIC BLOOD PRESSURE: 110 MMHG | RESPIRATION RATE: 18 BRPM | HEIGHT: 62 IN | TEMPERATURE: 99 F | DIASTOLIC BLOOD PRESSURE: 77 MMHG | OXYGEN SATURATION: 100 % | WEIGHT: 135.36 LBS

## 2023-06-05 VITALS
SYSTOLIC BLOOD PRESSURE: 102 MMHG | OXYGEN SATURATION: 100 % | RESPIRATION RATE: 15 BRPM | HEART RATE: 65 BPM | DIASTOLIC BLOOD PRESSURE: 71 MMHG

## 2023-06-05 DIAGNOSIS — Z92.89 PERSONAL HISTORY OF OTHER MEDICAL TREATMENT: Chronic | ICD-10-CM

## 2023-06-05 DIAGNOSIS — Z98.891 HISTORY OF UTERINE SCAR FROM PREVIOUS SURGERY: Chronic | ICD-10-CM

## 2023-06-05 DIAGNOSIS — N83.201 UNSPECIFIED OVARIAN CYST, RIGHT SIDE: ICD-10-CM

## 2023-06-05 DIAGNOSIS — Z98.890 OTHER SPECIFIED POSTPROCEDURAL STATES: Chronic | ICD-10-CM

## 2023-06-05 DIAGNOSIS — Z98.51 TUBAL LIGATION STATUS: Chronic | ICD-10-CM

## 2023-06-05 DIAGNOSIS — N83.202 UNSPECIFIED OVARIAN CYST, LEFT SIDE: ICD-10-CM

## 2023-06-05 PROBLEM — E03.9 HYPOTHYROIDISM, UNSPECIFIED: Chronic | Status: ACTIVE | Noted: 2023-05-22

## 2023-06-05 LAB — ABO RH CONFIRMATION: SIGNIFICANT CHANGE UP

## 2023-06-05 PROCEDURE — S2900: CPT

## 2023-06-05 PROCEDURE — 88331 PATH CONSLTJ SURG 1 BLK 1SPC: CPT | Mod: 26

## 2023-06-05 PROCEDURE — 88305 TISSUE EXAM BY PATHOLOGIST: CPT | Mod: 26

## 2023-06-05 PROCEDURE — 88331 PATH CONSLTJ SURG 1 BLK 1SPC: CPT

## 2023-06-05 PROCEDURE — 88112 CYTOPATH CELL ENHANCE TECH: CPT

## 2023-06-05 PROCEDURE — 36415 COLL VENOUS BLD VENIPUNCTURE: CPT

## 2023-06-05 PROCEDURE — C9399: CPT

## 2023-06-05 PROCEDURE — 58571 TLH W/T/O 250 G OR LESS: CPT

## 2023-06-05 PROCEDURE — 88305 TISSUE EXAM BY PATHOLOGIST: CPT

## 2023-06-05 PROCEDURE — 58571 TLH W/T/O 250 G OR LESS: CPT | Mod: 80

## 2023-06-05 PROCEDURE — 88112 CYTOPATH CELL ENHANCE TECH: CPT | Mod: 26

## 2023-06-05 RX ORDER — METOCLOPRAMIDE HCL 10 MG
10 TABLET ORAL ONCE
Refills: 0 | Status: DISCONTINUED | OUTPATIENT
Start: 2023-06-05 | End: 2023-06-05

## 2023-06-05 RX ORDER — SODIUM CHLORIDE 9 MG/ML
3 INJECTION INTRAMUSCULAR; INTRAVENOUS; SUBCUTANEOUS ONCE
Refills: 0 | Status: DISCONTINUED | OUTPATIENT
Start: 2023-06-05 | End: 2023-06-05

## 2023-06-05 RX ORDER — ONDANSETRON 8 MG/1
4 TABLET, FILM COATED ORAL ONCE
Refills: 0 | Status: DISCONTINUED | OUTPATIENT
Start: 2023-06-05 | End: 2023-06-05

## 2023-06-05 RX ORDER — METRONIDAZOLE 500 MG
500 TABLET ORAL ONCE
Refills: 0 | Status: COMPLETED | OUTPATIENT
Start: 2023-06-05 | End: 2023-06-05

## 2023-06-05 RX ORDER — FENTANYL CITRATE 50 UG/ML
25 INJECTION INTRAVENOUS
Refills: 0 | Status: DISCONTINUED | OUTPATIENT
Start: 2023-06-05 | End: 2023-06-05

## 2023-06-05 RX ORDER — HYDROMORPHONE HYDROCHLORIDE 2 MG/ML
0.5 INJECTION INTRAMUSCULAR; INTRAVENOUS; SUBCUTANEOUS ONCE
Refills: 0 | Status: DISCONTINUED | OUTPATIENT
Start: 2023-06-05 | End: 2023-06-05

## 2023-06-05 RX ORDER — LEVOTHYROXINE SODIUM 125 MCG
1 TABLET ORAL
Refills: 0 | DISCHARGE

## 2023-06-05 RX ORDER — CELECOXIB 200 MG/1
400 CAPSULE ORAL ONCE
Refills: 0 | Status: COMPLETED | OUTPATIENT
Start: 2023-06-05 | End: 2023-06-05

## 2023-06-05 RX ORDER — CEFAZOLIN SODIUM 1 G
2000 VIAL (EA) INJECTION ONCE
Refills: 0 | Status: DISCONTINUED | OUTPATIENT
Start: 2023-06-05 | End: 2023-06-05

## 2023-06-05 RX ORDER — SENNA PLUS 8.6 MG/1
1 TABLET ORAL
Qty: 4 | Refills: 0
Start: 2023-06-05 | End: 2023-06-08

## 2023-06-05 RX ORDER — OXYCODONE HYDROCHLORIDE 5 MG/1
5 TABLET ORAL ONCE
Refills: 0 | Status: DISCONTINUED | OUTPATIENT
Start: 2023-06-05 | End: 2023-06-05

## 2023-06-05 RX ORDER — ERGOCALCIFEROL 1.25 MG/1
0 CAPSULE ORAL
Refills: 0 | DISCHARGE

## 2023-06-05 RX ORDER — ACETAMINOPHEN 500 MG
975 TABLET ORAL ONCE
Refills: 0 | Status: COMPLETED | OUTPATIENT
Start: 2023-06-05 | End: 2023-06-05

## 2023-06-05 RX ADMIN — HYDROMORPHONE HYDROCHLORIDE 0.5 MILLIGRAM(S): 2 INJECTION INTRAMUSCULAR; INTRAVENOUS; SUBCUTANEOUS at 11:10

## 2023-06-05 RX ADMIN — CELECOXIB 400 MILLIGRAM(S): 200 CAPSULE ORAL at 06:57

## 2023-06-05 RX ADMIN — HYDROMORPHONE HYDROCHLORIDE 0.5 MILLIGRAM(S): 2 INJECTION INTRAMUSCULAR; INTRAVENOUS; SUBCUTANEOUS at 10:55

## 2023-06-05 RX ADMIN — Medication 975 MILLIGRAM(S): at 06:57

## 2023-06-05 RX ADMIN — Medication 200 MILLIGRAM(S): at 08:00

## 2023-06-05 NOTE — ASU DISCHARGE PLAN (ADULT/PEDIATRIC) - CARE PROVIDER_API CALL
Lennie Mccollum  Gynecologic Oncology  [address] [place] [id number]  Phone: [phone]  Fax: [phone]  Follow Up Time:

## 2023-06-05 NOTE — BRIEF OPERATIVE NOTE - OPERATION/FINDINGS
Normal appearing abdominal survey, bilateral cystic appearing ovaries. Grossly normal bilateral fallopian tubes and uterus Normal external genitalia, vagina, cervix. On laparoscopy, normal appearing abdominal survey, bilateral complex cystic appearing ovaries. Grossly normal bilateral fallopian tubes and uterus.   Frozen specimen: benign

## 2023-06-05 NOTE — BRIEF OPERATIVE NOTE - NSICDXBRIEFPROCEDURE_GEN_ALL_CORE_FT
PROCEDURES:  Hysterectomy, total, robot-assisted, laparoscopic, using da Suraj Xi, with bilateral salpingo-oophorectomy and cystoscopy 05-Jun-2023 10:28:15  Cate Richardson  Tissue frozen section examination 05-Jun-2023 10:28:37  Dylan, Cate

## 2023-06-07 LAB — NON-GYNECOLOGICAL CYTOLOGY STUDY: SIGNIFICANT CHANGE UP

## 2023-06-13 ENCOUNTER — NON-APPOINTMENT (OUTPATIENT)
Age: 53
End: 2023-06-13

## 2023-06-13 LAB — SURGICAL PATHOLOGY STUDY: SIGNIFICANT CHANGE UP

## 2023-06-21 ENCOUNTER — APPOINTMENT (OUTPATIENT)
Dept: GYNECOLOGIC ONCOLOGY | Facility: CLINIC | Age: 53
End: 2023-06-21

## 2023-06-26 ENCOUNTER — APPOINTMENT (OUTPATIENT)
Dept: GYNECOLOGIC ONCOLOGY | Facility: CLINIC | Age: 53
End: 2023-06-26
Payer: COMMERCIAL

## 2023-06-26 VITALS
SYSTOLIC BLOOD PRESSURE: 98 MMHG | TEMPERATURE: 98.2 F | OXYGEN SATURATION: 96 % | DIASTOLIC BLOOD PRESSURE: 64 MMHG | HEART RATE: 92 BPM

## 2023-06-26 DIAGNOSIS — N83.202 UNSPECIFIED OVARIAN CYST, RIGHT SIDE: ICD-10-CM

## 2023-06-26 DIAGNOSIS — N83.201 UNSPECIFIED OVARIAN CYST, RIGHT SIDE: ICD-10-CM

## 2023-06-26 PROCEDURE — 99024 POSTOP FOLLOW-UP VISIT: CPT

## 2023-06-26 NOTE — REASON FOR VISIT
[Post Op] : post op visit [de-identified] : 06/05/2023 [de-identified] : FEDERICA TLH, BSO, Uterosacral ligament fixation, PW and cystoscopy at [facility] for bilateral ovarian complex cysts  [de-identified] : Denies any SOB, abnormal pain or VB. Bowel and bladder function are wnl. Patient states she feels well from a gynecological stand point. Recovered well.

## 2023-06-26 NOTE — ASSESSMENT
[FreeTextEntry1] : 51 yo pt is s/p RA TLH, BSO, Uterosacral ligament fixation, PW and cystoscopy on 06/05/2023. Recovering well.

## 2023-06-26 NOTE — END OF VISIT
[FreeTextEntry3] : RTC in 4 weeks for cuff check [FreeTextEntry2] : Fang Wynn MA was present the entire duration of the patient interaction and gynecological exam.\par

## 2023-06-26 NOTE — DISCUSSION/SUMMARY
[Clean] : was clean [Dry] : was dry [Intact] : was intact [Erythema] : was not erythematous [Ecchymosis] : was not ecchymotic [Seroma] : had no seroma [None] : had no drainage [Normal Skin] : normal appearance [Firm] : soft [Tender] : nontender [Abnormal Bowel Sounds] : normal bowel sounds [Rebound] : no rebound tenderness [Guarding] : no guarding [Incisional Hernia] : no incisional hernia [Mass] : no palpable mass [Doing Well] : is doing well [Excellent Pain Control] : has excellent pain control [No Sign of Infection] : is showing no signs of infection [FreeTextEntry1] : Pertinent Findings: Uterus sounded to 6 cm, normal bilateral fallopian tubes, ovaries with multicystic serous cysts. \par No concerning intra-abdominal findings, no adhesive disease in abdomen or pelvis. Normal liver edge. Cystoscopy: \par normal bilateral ureteral jets, no injury to the bladder and no lesions noted.\par \par Final Diagnosis\par 1.  Uterus, cervix, bilateral fallopian tubes and ovaries, total\par hysterectomy with bilateral salpingo-oophorectomy:\par -Endometrium: Weakly proliferative\par -   Myometrium: Unremarkable\par \par -   Cervix: Unremarkable\par -   Bilateral fallopian tubes: unremarkable\par -   Left ovary: Inclusion cyst\par -   Right ovary:  tubo-ovarian adhesions and Inclusion cyst\par PW: Negative for malignant cells. Specimen is composed of abundant histiocytes, benign mesothelial cells,\par collagen balls and few scattered lymphocytes.\par

## 2023-06-27 PROBLEM — N83.201 UNSPECIFIED OVARIAN CYST, RIGHT SIDE: Chronic | Status: ACTIVE | Noted: 2023-05-22

## 2023-08-02 ENCOUNTER — NON-APPOINTMENT (OUTPATIENT)
Age: 53
End: 2023-08-02

## 2023-08-02 ENCOUNTER — APPOINTMENT (OUTPATIENT)
Dept: GYNECOLOGIC ONCOLOGY | Facility: CLINIC | Age: 53
End: 2023-08-02
Payer: COMMERCIAL

## 2023-08-02 PROCEDURE — 99024 POSTOP FOLLOW-UP VISIT: CPT

## 2023-08-02 NOTE — REASON FOR VISIT
[de-identified] : 06/05/2023 [de-identified] : FEDERICA TLH, BSO, Uterosacral ligament fixation, PW and cystoscopy at [facility] for bilateral ovarian complex cysts [de-identified] : Denies any SOB, abnormal pain or VB. Bowel and bladder function are wnl. Patient states she feels well from a gynecological stand point. Recovered well. Patient presents to office for a cuff check.

## 2023-08-02 NOTE — ASSESSMENT
[FreeTextEntry1] : 53 yo pt is s/p RA TLH, BSO, Uterosacral ligament fixation, PW and cystoscopy on 06/05/2023. Recovered well and cuff well healed. May resume normal activity. Pathology is benign. We discussed that she could consider hormone replacement therapy and she has systemic and intravaginal options. If she is having systemic symptoms to consider the transdermal option as the most safe otherwise the intravaginal option may be good enough. She will follow up with her gynecologist for discussion.

## 2023-08-02 NOTE — DISCUSSION/SUMMARY
[Clean] : was clean [Dry] : was dry [Intact] : was intact [None] : had no drainage [Normal Skin] : normal appearance [Doing Well] : is doing well [Excellent Pain Control] : has excellent pain control [No Sign of Infection] : is showing no signs of infection [Erythema] : was not erythematous [Ecchymosis] : was not ecchymotic [Seroma] : had no seroma [Firm] : soft [Tender] : nontender [Abnormal Bowel Sounds] : normal bowel sounds [Rebound] : no rebound tenderness [Guarding] : no guarding [Incisional Hernia] : no incisional hernia [Mass] : no palpable mass [External Genitalia Abnormal] : normal external genitalia [Vaginal Exam Abnormal] : normal vaginal exam [de-identified] : cuff well healed [FreeTextEntry1] : Pertinent Findings: Uterus sounded to 6 cm, normal bilateral fallopian tubes, ovaries with multicystic serous cysts. \par No concerning intra-abdominal findings, no adhesive disease in abdomen or pelvis. Normal liver edge. Cystoscopy: \par normal bilateral ureteral jets, no injury to the bladder and no lesions noted.\par \par Final Diagnosis\par 1. Uterus, cervix, bilateral fallopian tubes and ovaries, total\par hysterectomy with bilateral salpingo-oophorectomy:\par -Endometrium: Weakly proliferative\par - Myometrium: Unremarkable\par \par - Cervix: Unremarkable\par - Bilateral fallopian tubes: unremarkable\par - Left ovary: Inclusion cyst\par - Right ovary: tubo-ovarian adhesions and Inclusion cyst\par PW: Negative for malignant cells. Specimen is composed of abundant histiocytes, benign mesothelial cells,\par collagen balls and few scattered lymphocytes.\par

## 2023-08-02 NOTE — END OF VISIT
[FreeTextEntry3] : Discharge to routine gynecologic care and for estrogen replacement discussion [FreeTextEntry2] : Claudia Renner MA was present the entire duration of the patient interaction and gynecological exam.

## 2023-09-26 ENCOUNTER — APPOINTMENT (OUTPATIENT)
Dept: OBGYN | Facility: CLINIC | Age: 53
End: 2023-09-26
Payer: COMMERCIAL

## 2023-09-26 VITALS
WEIGHT: 150 LBS | BODY MASS INDEX: 24.11 KG/M2 | SYSTOLIC BLOOD PRESSURE: 129 MMHG | DIASTOLIC BLOOD PRESSURE: 79 MMHG | HEIGHT: 66 IN

## 2023-09-26 VITALS
WEIGHT: 130 LBS | SYSTOLIC BLOOD PRESSURE: 102 MMHG | DIASTOLIC BLOOD PRESSURE: 65 MMHG | BODY MASS INDEX: 23.92 KG/M2 | HEIGHT: 62 IN

## 2023-09-26 PROCEDURE — 99212 OFFICE O/P EST SF 10 MIN: CPT

## 2024-01-02 ENCOUNTER — APPOINTMENT (OUTPATIENT)
Dept: OBGYN | Facility: CLINIC | Age: 54
End: 2024-01-02
Payer: COMMERCIAL

## 2024-01-02 VITALS
WEIGHT: 135 LBS | DIASTOLIC BLOOD PRESSURE: 67 MMHG | HEIGHT: 62 IN | SYSTOLIC BLOOD PRESSURE: 102 MMHG | BODY MASS INDEX: 24.84 KG/M2

## 2024-01-02 DIAGNOSIS — K46.9 UNSPECIFIED ABDOMINAL HERNIA W/OUT OBSTRUCTION OR GANGRENE: ICD-10-CM

## 2024-01-02 DIAGNOSIS — K43.2 INCISIONAL HERNIA W/OUT OBSTRUCTION OR GANGRENE: ICD-10-CM

## 2024-01-02 DIAGNOSIS — Z01.419 ENCOUNTER FOR GYNECOLOGICAL EXAMINATION (GENERAL) (ROUTINE) W/OUT ABNORMAL FINDINGS: ICD-10-CM

## 2024-01-02 PROCEDURE — 99212 OFFICE O/P EST SF 10 MIN: CPT | Mod: 25

## 2024-01-02 PROCEDURE — 99396 PREV VISIT EST AGE 40-64: CPT | Mod: 25

## 2024-01-02 RX ORDER — MISOPROSTOL 200 UG/1
200 TABLET ORAL
Qty: 4 | Refills: 0 | Status: COMPLETED | COMMUNITY
Start: 2023-05-02 | End: 2024-01-02

## 2024-01-02 NOTE — HISTORY OF PRESENT ILLNESS
[FreeTextEntry1] : 51yo  PMF s/p RATLH,BSO, USLF, PW and cysto is here today for an annual visit today. She states all is well however she complaints of vaginal dryness during intercourse that the water-based lube hasn't really been helping with. As well as feeling a hernia in the upper abdomen (epigastric area) and indigestion.  No other GI changes.  [Patient reported mammogram was normal] : Patient reported mammogram was normal [Patient reported breast sonogram was normal] : Patient reported breast sonogram was normal [Patient reported PAP Smear was normal] : Patient reported PAP Smear was normal [Patient reported bone density results were abnormal] : Patient reported bone density results were abnormal [Patient reported colonoscopy was normal] : Patient reported colonoscopy was normal [Mammogramdate] : 2022 [BreastSonogramDate] : 2022 [TextBox_25] : BIRADS 2 [PapSmeardate] : 2022 [BoneDensityDate] : 2021 [TextBox_37] : osteopenia  [ColonoscopyDate] : 6 months ago

## 2024-01-02 NOTE — HISTORY OF PRESENT ILLNESS
[FreeTextEntry1] : 53yo  RIMMA is here today for a follow up regarding her previous surgery (RATLH, BSO, uterosacral ligament fixation, PW and cysto). She states all is well however she complaints of feeling blockage during intercourse and vaginal dryness.

## 2024-01-02 NOTE — END OF VISIT
[FreeTextEntry3] :  I, Cesia Bardales solely acted as a scribe for Dr. Sheeba Hannah on 09/26/2023 . All medical entries made by the scribe were at my, Dr. Hannah's, direction and personally dictated by me on 09/26/2023 . I have reviewed the chart and agree that the record accurately reflects my personal performance of the history, physical exam, assessment and plan. I have also personally directed, reviewed, and agreed with the chart. [Time Spent: ___ minutes] : I have spent [unfilled] minutes of time on the encounter.

## 2024-01-02 NOTE — DISCUSSION/SUMMARY
[FreeTextEntry1] : 53yo  PMF s/p RATLH, BSO, uterosacral ligament fixation, PW and cysto. Doing well.  RHM:  - DVS neg x 3  - Dentist, PCP, Derm as discussed  - Rx given for DEXA, mammo/sono  - Offered STI screen today. Pt declined  - Encouraged healthy diet, exercise, weight maint  Vulvar Mole:  - Strict precautions and aftercare instructions reviewed - Aware she cannot have anything in vagina or be submerged in water two weeks post procedure. - RTO for vulvar biopsy and follow up  - Will review abdo sono results at that time   Hernia: -Abdomen ultrasound ordered. May need CT scan and GI or Gyn Onc   Vaginal Dryness- -We discussed options i.e estrogen and e-string may help. Pt declined E2 and desires to use Rev[facility].  Info given today and she will decide if she will use it.   All questions were addressed at today's visit.  Sheeba Hannah MD  Obstetrician/Gynecologist

## 2024-01-02 NOTE — PHYSICAL EXAM
[Chaperone Present] : A chaperone was present in the examining room during all aspects of the physical examination [Appropriately responsive] : appropriately responsive [Alert] : alert [No Acute Distress] : no acute distress [No Lymphadenopathy] : no lymphadenopathy [Soft] : soft [Non-tender] : non-tender [Non-distended] : non-distended [No HSM] : No HSM [No Lesions] : no lesions [No Mass] : no mass [Oriented x3] : oriented x3 [Examination Of The Breasts] : a normal appearance [No Masses] : no breast masses were palpable [Labia Majora] : normal [Labia Minora] : normal [Normal] : normal [Regular Rate Rhythm] : regular rate rhythm [No Murmurs] : no murmurs [Clear to Auscultation B/L] : clear to auscultation bilaterally [Atrophy] : atrophy [No Bleeding] : There was no active vaginal bleeding [Absent] : absent [Uterine Adnexae] : absent [FreeTextEntry7] : Pt with soft, tender, 3cm x 2cm "lump in epigastric area just below one of her incisional scars. No mobile.  [FreeTextEntry1] : Pt with small, multicolored mole near L groin area.  [FreeTextEntry4] : cuff intact

## 2024-01-02 NOTE — DISCUSSION/SUMMARY
[FreeTextEntry1] : 53yo  PMF s/p RATLH, BSO, uterosacral ligament fixation, PW and cysto. Doing well.    -We discussed that she will no longer needs pap smears but will still need an exam. -Suture removed today in office. -Cuff is healed and intact.  Vaginal Dryness- -We discussed options i.e estrogen and  e-string may help. Risk were advised. -Good, clean, love lubricant discussed.  All questions were addressed at today's visit. RTO in one year for annual or as needed.  Sheeba Hannah MD  Obstetrician/Gynecologist

## 2024-01-02 NOTE — END OF VISIT
[Time Spent: ___ minutes] : I have spent [unfilled] minutes of time on the encounter. [FreeTextEntry3] :  I, Cesia Bardales solely acted as a scribe for Dr. Sheeba Hannah on 01/02/2024 . All medical entries made by the scribe were at my, Dr. Hannah's, direction and personally dictated by me on 01/02/2024 . I have reviewed the chart and agree that the record accurately reflects my personal performance of the history, physical exam, assessment and plan. I have also personally directed, reviewed, and agreed with the chart.

## 2024-01-02 NOTE — PHYSICAL EXAM
[Labia Majora] : normal [Labia Minora] : normal [Normal] : normal [No Bleeding] : There was no active vaginal bleeding [Absent] : absent [Uterine Adnexae] : absent [FreeTextEntry4] : cuff inact

## 2024-01-04 ENCOUNTER — APPOINTMENT (OUTPATIENT)
Dept: ULTRASOUND IMAGING | Facility: CLINIC | Age: 54
End: 2024-01-04
Payer: COMMERCIAL

## 2024-01-04 PROCEDURE — 76700 US EXAM ABDOM COMPLETE: CPT

## 2024-01-08 ENCOUNTER — NON-APPOINTMENT (OUTPATIENT)
Age: 54
End: 2024-01-08

## 2024-01-08 LAB
DATE COLLECTED: NORMAL
HEMOCCULT SP1 STL QL: NEGATIVE
QUALITY CONTROL: YES

## 2024-01-18 ENCOUNTER — APPOINTMENT (OUTPATIENT)
Dept: RADIOLOGY | Facility: CLINIC | Age: 54
End: 2024-01-18
Payer: COMMERCIAL

## 2024-01-18 ENCOUNTER — RESULT REVIEW (OUTPATIENT)
Age: 54
End: 2024-01-18

## 2024-01-18 ENCOUNTER — APPOINTMENT (OUTPATIENT)
Dept: MAMMOGRAPHY | Facility: CLINIC | Age: 54
End: 2024-01-18
Payer: COMMERCIAL

## 2024-01-18 ENCOUNTER — APPOINTMENT (OUTPATIENT)
Dept: ULTRASOUND IMAGING | Facility: CLINIC | Age: 54
End: 2024-01-18
Payer: COMMERCIAL

## 2024-01-18 PROCEDURE — 76641 ULTRASOUND BREAST COMPLETE: CPT | Mod: 50

## 2024-01-18 PROCEDURE — 77067 SCR MAMMO BI INCL CAD: CPT

## 2024-01-18 PROCEDURE — 77063 BREAST TOMOSYNTHESIS BI: CPT

## 2024-01-18 PROCEDURE — 77080 DXA BONE DENSITY AXIAL: CPT

## 2024-01-23 ENCOUNTER — APPOINTMENT (OUTPATIENT)
Dept: CT IMAGING | Facility: CLINIC | Age: 54
End: 2024-01-23
Payer: COMMERCIAL

## 2024-01-23 ENCOUNTER — APPOINTMENT (OUTPATIENT)
Dept: OBGYN | Facility: CLINIC | Age: 54
End: 2024-01-23

## 2024-01-23 ENCOUNTER — RESULT REVIEW (OUTPATIENT)
Age: 54
End: 2024-01-23

## 2024-01-23 PROCEDURE — 74176 CT ABD & PELVIS W/O CONTRAST: CPT

## 2024-02-03 ENCOUNTER — NON-APPOINTMENT (OUTPATIENT)
Age: 54
End: 2024-02-03

## 2024-02-06 ENCOUNTER — APPOINTMENT (OUTPATIENT)
Dept: OBGYN | Facility: CLINIC | Age: 54
End: 2024-02-06

## 2024-02-07 ENCOUNTER — NON-APPOINTMENT (OUTPATIENT)
Age: 54
End: 2024-02-07

## 2024-02-07 DIAGNOSIS — N60.01 SOLITARY CYST OF RIGHT BREAST: ICD-10-CM

## 2024-09-18 ENCOUNTER — APPOINTMENT (OUTPATIENT)
Dept: ULTRASOUND IMAGING | Facility: CLINIC | Age: 54
End: 2024-09-18

## 2025-04-24 ENCOUNTER — APPOINTMENT (OUTPATIENT)
Dept: MAMMOGRAPHY | Facility: CLINIC | Age: 55
End: 2025-04-24
Payer: COMMERCIAL

## 2025-04-24 ENCOUNTER — APPOINTMENT (OUTPATIENT)
Dept: ULTRASOUND IMAGING | Facility: CLINIC | Age: 55
End: 2025-04-24
Payer: COMMERCIAL

## 2025-04-24 PROCEDURE — 77066 DX MAMMO INCL CAD BI: CPT

## 2025-04-24 PROCEDURE — 76641 ULTRASOUND BREAST COMPLETE: CPT | Mod: 50

## 2025-04-24 PROCEDURE — G0279: CPT

## (undated) DEVICE — XI ARM FORCE BIPOLAR 8MM

## (undated) DEVICE — XI DRAPE COLUMN

## (undated) DEVICE — XI DRAPE ARM

## (undated) DEVICE — XI ARM SCISSOR MONO CURVED

## (undated) DEVICE — WOUND IRR IRRISEPT W 0.5 CHG

## (undated) DEVICE — PREP DYNA-HEX CHG 4% 4OZ BOTTLE (BACTOSHIELD)

## (undated) DEVICE — XI SEAL UNIV 5- 8 MM

## (undated) DEVICE — ELCTR CORD FOOTSWITCH 1PLR LAPSCP 10FT

## (undated) DEVICE — SOL IRR POUR H2O 1000ML

## (undated) DEVICE — DEVICE PUREVIEW ACTIVE

## (undated) DEVICE — XI ARM NEEDLE DRIVER MEGA

## (undated) DEVICE — INSUFFLATION NDL COVIDIEN SURGINEEDLE VERESS 120MM

## (undated) DEVICE — POSITIONER PINK PAD PIGAZZI SYSTEM

## (undated) DEVICE — PREP CHLORAPREP HI-LITE ORANGE 26ML

## (undated) DEVICE — PREP TRAY DRY SKIN PREP SCRUB

## (undated) DEVICE — SUT VLOC 180 0 9" GS-21 GREEN

## (undated) DEVICE — PACK ROBOTIC

## (undated) DEVICE — DRSG KERLIX ROLL 4.5"

## (undated) DEVICE — ELCTR BOVIE PENCIL HANDPIECE ROCKER SWITCH 15FT

## (undated) DEVICE — CLEANER FOR ELCTR TIP

## (undated) DEVICE — WARMING BLANKET UPPER ADULT

## (undated) DEVICE — DRAPE ROBOTIC

## (undated) DEVICE — SUT MONOCRYL 4-0 27" PS-2 UNDYED

## (undated) DEVICE — DRAPE TOWEL BLUE STICKY

## (undated) DEVICE — TROCAR COVIDIEN VERSAPORT BLADELESS OPTICAL 5MM STANDARD

## (undated) DEVICE — POSITIONER FOAM EGG CRATE ULNAR 2PCS (PINK)

## (undated) DEVICE — GLV 7.5 PROTEXIS (WHITE)

## (undated) DEVICE — DRSG DERMABOND 0.7ML

## (undated) DEVICE — XI TIP COVER

## (undated) DEVICE — SOL IRR POUR NS 0.9% 1000ML

## (undated) DEVICE — RUMI KOH-EFFICIENT 2.5CM

## (undated) DEVICE — RUMI TIP LAVENDER 5.1MM X 6CM

## (undated) DEVICE — ENDOCATCH ROBOTIC 8MM (PURPLE)

## (undated) DEVICE — ELCTR GROUNDING PAD ADULT COVIDIEN

## (undated) DEVICE — VENODYNE/SCD SLEEVE CALF MEDIUM

## (undated) DEVICE — XI ARM FORCEP PROGRASP 8MM